# Patient Record
(demographics unavailable — no encounter records)

---

## 2024-12-19 NOTE — HISTORY OF PRESENT ILLNESS
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

## 2024-12-19 NOTE — ASSESSMENT
[FreeTextEntry1] : ear pain from dental work left shoulder pain capuslitis steroids see ortho likely work related

## 2025-01-24 NOTE — REASON FOR VISIT
[Initial Visit] : an initial visit for [[facility] ] : provided by [employer] [facility]   [Time Spent: ____ minutes] : Total time spent using  services: [unfilled] minutes. The patient's primary language is not English thus required  services. [FreeTextEntry2] : Left shoulder pain  [Interpreters_IDNumber] : [id number] [Interpreters_FullName] : Mk [TWNoteComboBox1] : [place]

## 2025-01-24 NOTE — PHYSICAL EXAM
[de-identified] : General: Awake, alert, no acute distress, Patient was cooperative and appropriate during the examination.  Walks without an antalgic gait.   Left shoulder Exam: Physical exam of the shoulder demonstrates normal skin without signs of skin changes or abnormalities. No erythema, warmth, or joint effusion appreciated.  Sensation intact light touch C5-T1 Palpable radial pulse Radial/ulnar/median/axillary/musculocutaneous/AIN/PIN nerves grossly intact  Range of motion: Forward Flexion: 175 Abduction: 140 External Rotation: 45 Internal Rotation: L3  Palpation: Not tender to palpation over the glenohumeral joint Moderately tender palpation over the rotator cuff insertion on the greater tuberosity Not tender to palpation over the AC joint Moderately tender to palpation of the biceps tendon/bicipital groove  Strength testing: Supraspinatus: 5/5 Infraspinatus: 5/5 Subscapularis: 5/5  Special test: Empty can test positive Daly impingement test positive Speeds test positive Dillingham's test negative Lift-off test negative Bell-press test negative Cross-arm adduction test negative   [de-identified] : X-rays including 4 views of the left shoulder obtained in the office on 1/24/2025 and reviewed with the patient.  There is no acute fracture or dislocation.  There is no significant arthritis.

## 2025-01-24 NOTE — HISTORY OF PRESENT ILLNESS
[de-identified] : 1/24/2025: Judy is a pleasant 28-year-old right-hand-dominant female who presents to the office today for evaluation of left shoulder pain.  This visit was performed with the use of a .  The patient states that her shoulder pain began a couple months ago but she denies any history of trauma.  The pain is activity related and worse with overhead reaching.  The pain is severe by rest.  She occasionally takes over-the-counter medication for pain.  The pain is not associate with any stiffness.  She has not had any recent treatment.  The patient denies any fevers, chills, sweats, recent illnesses, numbness, tingling, weakness, or pain elsewhere at this time.

## 2025-01-24 NOTE — DISCUSSION/SUMMARY
[de-identified] : Assessment: 28-year-old female with left shoulder pain secondary to rotator cuff and biceps tendinitis  Plan: I had a long discussion with the patient today regarding the nature of their diagnosis and treatment plan. We discussed the risks and benefits of no treatment as well as nonoperative and operative treatments.  I reviewed the patient's x-rays today with her in the office which are negative for any acute pathology.  On examination she has good range of motion and strength with pain to palpation about the bicipital groove and greater tuberosity.  At this time I recommend conservative treatment of the patient's condition with modalities including rest, ice, heat, anti-inflammatory medications, activity modifications, and home stretching and strengthening exercises.  A prescription for diclofenac was sent to the patient's pharmacy today.  I discussed with the patient the risks and benefits associated with NSAID use. GI precautions were discussed.  A referral for physical therapy was provided to begin working on exercises to help improve their strength and function.  Recommend follow-up in 8 weeks repeat clinical assessment as needed.  If symptoms persist we may consider an MRI versus injection.  The patient verbalizes their understanding and agrees with the plan.  All questions were answered to their satisfaction.   I, Dr. Heart, personally performed the evaluation and management (E/M) services for this new patient.  That E/M includes conducting the clinically appropriate initial history &/or exam, assessing all conditions, and establishing the plan of care.  Today, my TAN, was here to observe my evaluation and management service for this patient & follow plan of care established by me going forward.